# Patient Record
Sex: MALE | Race: WHITE | NOT HISPANIC OR LATINO | URBAN - METROPOLITAN AREA
[De-identification: names, ages, dates, MRNs, and addresses within clinical notes are randomized per-mention and may not be internally consistent; named-entity substitution may affect disease eponyms.]

---

## 2017-11-16 ENCOUNTER — IMPORTED ENCOUNTER (OUTPATIENT)
Dept: URBAN - METROPOLITAN AREA CLINIC 50 | Facility: CLINIC | Age: 82
End: 2017-11-16

## 2017-11-21 ENCOUNTER — IMPORTED ENCOUNTER (OUTPATIENT)
Dept: URBAN - METROPOLITAN AREA CLINIC 50 | Facility: CLINIC | Age: 82
End: 2017-11-21

## 2017-12-20 ENCOUNTER — IMPORTED ENCOUNTER (OUTPATIENT)
Dept: URBAN - METROPOLITAN AREA CLINIC 50 | Facility: CLINIC | Age: 82
End: 2017-12-20

## 2017-12-28 ENCOUNTER — IMPORTED ENCOUNTER (OUTPATIENT)
Dept: URBAN - METROPOLITAN AREA CLINIC 50 | Facility: CLINIC | Age: 82
End: 2017-12-28

## 2018-01-04 ENCOUNTER — IMPORTED ENCOUNTER (OUTPATIENT)
Dept: URBAN - METROPOLITAN AREA CLINIC 50 | Facility: CLINIC | Age: 83
End: 2018-01-04

## 2018-01-04 NOTE — PATIENT DISCUSSION
"""S/P IOL OD: Tecnis ZCB00 18.5 (Target: -0.25) +Femto/Arcs +TM/Omidria. Continue post operative instructions and drops per schedule.  """

## 2018-01-11 ENCOUNTER — IMPORTED ENCOUNTER (OUTPATIENT)
Dept: URBAN - METROPOLITAN AREA CLINIC 50 | Facility: CLINIC | Age: 83
End: 2018-01-11

## 2018-01-18 ENCOUNTER — IMPORTED ENCOUNTER (OUTPATIENT)
Dept: URBAN - METROPOLITAN AREA CLINIC 50 | Facility: CLINIC | Age: 83
End: 2018-01-18

## 2018-01-18 NOTE — PATIENT DISCUSSION
"""S/P IOL OS: Tecnis ZCB00 19.0 +Femto/Arcs +TM. Continue post operative instructions and drops per schedule.  """

## 2018-01-25 ENCOUNTER — IMPORTED ENCOUNTER (OUTPATIENT)
Dept: URBAN - METROPOLITAN AREA CLINIC 50 | Facility: CLINIC | Age: 83
End: 2018-01-25

## 2018-02-05 ENCOUNTER — IMPORTED ENCOUNTER (OUTPATIENT)
Dept: URBAN - METROPOLITAN AREA CLINIC 50 | Facility: CLINIC | Age: 83
End: 2018-02-05

## 2018-02-15 ENCOUNTER — IMPORTED ENCOUNTER (OUTPATIENT)
Dept: URBAN - METROPOLITAN AREA CLINIC 50 | Facility: CLINIC | Age: 83
End: 2018-02-15

## 2018-02-15 NOTE — PATIENT DISCUSSION
"""S/P IOL OU: OD: Tecnis ZCB00 18.5 (Target: -0.25)Femto/Arcs +TM. OS: Tecnis ZCB00 19.0/Arcs +TM. MRx given today. "

## 2018-02-27 ENCOUNTER — IMPORTED ENCOUNTER (OUTPATIENT)
Dept: URBAN - METROPOLITAN AREA CLINIC 50 | Facility: CLINIC | Age: 83
End: 2018-02-27

## 2018-03-02 ENCOUNTER — IMPORTED ENCOUNTER (OUTPATIENT)
Dept: URBAN - METROPOLITAN AREA CLINIC 50 | Facility: CLINIC | Age: 83
End: 2018-03-02

## 2018-05-10 ENCOUNTER — IMPORTED ENCOUNTER (OUTPATIENT)
Dept: URBAN - METROPOLITAN AREA CLINIC 50 | Facility: CLINIC | Age: 83
End: 2018-05-10

## 2019-06-20 ENCOUNTER — IMPORTED ENCOUNTER (OUTPATIENT)
Dept: URBAN - METROPOLITAN AREA CLINIC 50 | Facility: CLINIC | Age: 84
End: 2019-06-20

## 2019-06-27 ENCOUNTER — IMPORTED ENCOUNTER (OUTPATIENT)
Dept: URBAN - METROPOLITAN AREA CLINIC 50 | Facility: CLINIC | Age: 84
End: 2019-06-27

## 2019-07-25 ENCOUNTER — IMPORTED ENCOUNTER (OUTPATIENT)
Dept: URBAN - METROPOLITAN AREA CLINIC 50 | Facility: CLINIC | Age: 84
End: 2019-07-25

## 2020-07-30 ENCOUNTER — IMPORTED ENCOUNTER (OUTPATIENT)
Dept: URBAN - METROPOLITAN AREA CLINIC 50 | Facility: CLINIC | Age: 85
End: 2020-07-30

## 2020-07-30 NOTE — PATIENT DISCUSSION
"""Followed by Dr. Tamara Sewell. Mac OCT OU done today. Monitor ERM for changes.  Informed patient of ""

## 2021-03-17 ENCOUNTER — IMPORTED ENCOUNTER (OUTPATIENT)
Dept: URBAN - METROPOLITAN AREA CLINIC 50 | Facility: CLINIC | Age: 86
End: 2021-03-17

## 2021-04-17 ASSESSMENT — VISUAL ACUITY
OD_SC: 20/30-2
OD_CC: J2
OD_PH: 20/30-
OS_BAT: 20/60
OS_CC: J2
OD_CC: J1
OS_OTHER: 20/60. 20/100.
OS_SC: 20/40-1
OS_CC: 20/25-1
OS_BAT: 20/40
OS_BAT: 20/40
OD_PH: 20/40-1
OD_SC: 20/40
OD_CC: 20/30-2
OS_CC: J1
OS_SC: 20/30
OS_SC: 20/30-2
OD_CC: 20/50
OD_SC: 20/40+1
OS_SC: 20/20
OS_CC: 20/30
OS_CC: 20/30-2
OS_PH: @ 17 IN
OD_CC: 20/40
OS_CC: J1@ 15 IN
OD_CC: 20/50-1
OD_BAT: 20/40
OD_OTHER: 20/40. 20/60.
OS_CC: 20/30-1
OS_CC: 20/30
OD_BAT: 20/70
OD_CC: J1@ 18 IN
OS_CC: J1@ 18 IN
OD_CC: J1@ 15 IN
OD_OTHER: 20/40. 20/70.
OD_CC: 20/30
OD_BAT: 20/40
OS_SC: 20/200
OD_CC: J1+
OD_SC: 20/30-1
OD_CC: 20/40-2
OS_CC: J1+
OD_SC: 20/80
OD_CC: 20/40
OS_CC: 20/25-2
OD_PH: @ 17 IN
OS_PH: 20/25
OS_OTHER: 20/40. 20/50.
OS_CC: 20/40
OS_OTHER: 20/40. 20/80.
OD_OTHER: 20/70. 20/400.

## 2021-04-17 ASSESSMENT — TONOMETRY
OD_IOP_MMHG: 16
OS_IOP_MMHG: 13
OS_IOP_MMHG: 14
OS_IOP_MMHG: 16
OD_IOP_MMHG: 12
OS_IOP_MMHG: 16
OD_IOP_MMHG: 24
OD_IOP_MMHG: 13
OD_IOP_MMHG: 14
OD_IOP_MMHG: 15
OD_IOP_MMHG: 12
OS_IOP_MMHG: 16
OS_IOP_MMHG: 12
OS_IOP_MMHG: 14
OD_IOP_MMHG: 16
OD_IOP_MMHG: 13
OD_IOP_MMHG: 13
OS_IOP_MMHG: 23
OS_IOP_MMHG: 14
OS_IOP_MMHG: 15
OD_IOP_MMHG: 14
OS_IOP_MMHG: 14

## 2021-09-24 ENCOUNTER — PREPPED CHART (OUTPATIENT)
Dept: URBAN - METROPOLITAN AREA CLINIC 52 | Facility: CLINIC | Age: 86
End: 2021-09-24

## 2021-09-30 ENCOUNTER — ANNUAL COMPREHENSIVE EXAM (OUTPATIENT)
Dept: URBAN - METROPOLITAN AREA CLINIC 52 | Facility: CLINIC | Age: 86
End: 2021-09-30

## 2021-09-30 DIAGNOSIS — H35.3131: ICD-10-CM

## 2021-09-30 PROCEDURE — 92015 DETERMINE REFRACTIVE STATE: CPT

## 2021-09-30 PROCEDURE — 92014 COMPRE OPH EXAM EST PT 1/>: CPT

## 2021-09-30 PROCEDURE — 92134 CPTRZ OPH DX IMG PST SGM RTA: CPT

## 2021-09-30 ASSESSMENT — VISUAL ACUITY
OD_SC: 20/50
OU_CC: J1+
OS_CC: 20/20
OD_CC: 20/50+2
OS_SC: 20/30-1

## 2021-09-30 ASSESSMENT — TONOMETRY
OD_IOP_MMHG: 12
OS_IOP_MMHG: 12

## 2021-11-03 NOTE — PATIENT DISCUSSION
Educated patient about signs and symptoms of ocular involvement and asked patient to call immediately for any increasing eye pain, redness or photosensitivity. Continue meds as directed by PCP. No additional tx indicated.

## 2022-03-17 NOTE — PATIENT DISCUSSION
Ed. patient. Continue pred QID x 1 week. monitor x 1 week for improvement. If improved OK to start taper.

## 2023-01-17 ENCOUNTER — COMPREHENSIVE EXAM (OUTPATIENT)
Dept: URBAN - METROPOLITAN AREA CLINIC 52 | Facility: CLINIC | Age: 88
End: 2023-01-17

## 2023-01-17 DIAGNOSIS — H35.373: ICD-10-CM

## 2023-01-17 DIAGNOSIS — H35.3131: ICD-10-CM

## 2023-01-17 DIAGNOSIS — H43.813: ICD-10-CM

## 2023-01-17 PROCEDURE — 92134 CPTRZ OPH DX IMG PST SGM RTA: CPT

## 2023-01-17 PROCEDURE — 92015 DETERMINE REFRACTIVE STATE: CPT

## 2023-01-17 PROCEDURE — 92014 COMPRE OPH EXAM EST PT 1/>: CPT

## 2023-01-17 ASSESSMENT — KERATOMETRY
OD_AXISANGLE_DEGREES: 116
OD_K1POWER_DIOPTERS: 43.00
OD_AXISANGLE2_DEGREES: 26
OS_AXISANGLE2_DEGREES: 154
OS_AXISANGLE_DEGREES: 64
OS_K1POWER_DIOPTERS: 42.50
OD_K2POWER_DIOPTERS: 43.50
OS_K2POWER_DIOPTERS: 43.00

## 2023-01-17 ASSESSMENT — VISUAL ACUITY
OD_CC: 20/40-1
OS_PH: 20/30+1
OS_CC: 20/30

## 2023-01-17 ASSESSMENT — TONOMETRY
OD_IOP_MMHG: 11
OS_IOP_MMHG: 11